# Patient Record
Sex: MALE | Race: WHITE | Employment: UNEMPLOYED | URBAN - METROPOLITAN AREA
[De-identification: names, ages, dates, MRNs, and addresses within clinical notes are randomized per-mention and may not be internally consistent; named-entity substitution may affect disease eponyms.]

---

## 2021-09-03 ENCOUNTER — OFFICE VISIT (OUTPATIENT)
Dept: SURGERY | Age: 41
End: 2021-09-03
Payer: COMMERCIAL

## 2021-09-03 ENCOUNTER — TELEPHONE (OUTPATIENT)
Dept: SURGERY | Age: 41
End: 2021-09-03

## 2021-09-03 VITALS
HEIGHT: 66 IN | HEART RATE: 105 BPM | DIASTOLIC BLOOD PRESSURE: 81 MMHG | OXYGEN SATURATION: 97 % | RESPIRATION RATE: 16 BRPM | SYSTOLIC BLOOD PRESSURE: 128 MMHG | BODY MASS INDEX: 23.46 KG/M2 | WEIGHT: 146 LBS

## 2021-09-03 DIAGNOSIS — K64.2 GRADE III HEMORRHOIDS: Primary | ICD-10-CM

## 2021-09-03 PROCEDURE — G8420 CALC BMI NORM PARAMETERS: HCPCS | Performed by: SURGERY

## 2021-09-03 PROCEDURE — G8427 DOCREV CUR MEDS BY ELIG CLIN: HCPCS | Performed by: SURGERY

## 2021-09-03 PROCEDURE — 4004F PT TOBACCO SCREEN RCVD TLK: CPT | Performed by: SURGERY

## 2021-09-03 PROCEDURE — 99203 OFFICE O/P NEW LOW 30 MIN: CPT | Performed by: SURGERY

## 2021-09-03 RX ORDER — VENLAFAXINE HYDROCHLORIDE 150 MG/1
150 CAPSULE, EXTENDED RELEASE ORAL DAILY
COMMUNITY

## 2021-09-03 RX ORDER — QUETIAPINE FUMARATE 25 MG/1
1 TABLET, FILM COATED ORAL DAILY
COMMUNITY
Start: 2021-07-27

## 2021-09-03 RX ORDER — CLONAZEPAM 0.5 MG/1
1 TABLET, ORALLY DISINTEGRATING ORAL
Status: ON HOLD | COMMUNITY
Start: 2021-08-10 | End: 2021-09-10 | Stop reason: HOSPADM

## 2021-09-03 NOTE — PROGRESS NOTES
PRE-OP INSTRUCTIONS FOR THE SURGICAL PATIENT YOU ARE UNABLE TO MAKE CONTACT FOR AN INTERVIEW:    All patients having surgery or anesthesia are required to be Covid tested OR to have been vaccinated at least 14 days prior to your procedure. It is very important to return our call to [phone] and notify the staff of your last vaccination date otherwise you will be required to complete Covid PCR test within the 5-6 days prior to surgery & quarantine. The results will need to be faxed to PreAdmission Testing at [phone]. 1. Follow all instructions provided to you from your surgeons office, including your ARRIVAL TIME. 2. Enter the MAIN entrance located on [employer] and report to the desk. 3. Bring your insurance & photo ID with you. You may also be asked to pay a co-pay, as you may want to bring a check or credit card with you. 4. Leave all other valuables at home. 5. Arrange for someone to drive you home and be with you for the first 24 hours after discharge. 6. Bring your medication list with you day of surgery with doses and frequency listed (including over the counter medications)  7. You must contact your surgeon for Instructions regarding:              - ALL medication instructions, especially if taking blood thinners, aspirin, or diabetic medication.         -Bariatric patients call surgeon if on diabetic medications as some need to be stopped 1 week preop  - IF  there is a change in your physical condition such as a cold, fever, rash, cuts, sores or any other infection, especially near your surgical site. 8. A Pre-op History and Physical for surgery MUST be completed by your Physician or an Urgent Care within 30 days of your procedure date. Please bring a copy with you on the day of your procedure and along with any other testing performed. 9. DO NOT EAT ANYTHING eight hours prior to arrival for surgery.   May have up to 8 ounces of water 4 hours prior to arrival for surgery. NOTE: ALL Gastric, Bariatric and Bowel surgery patients MUST follow their surgeon's instructions. 10. No gum, candy, mints, or ice chips day of procedure. 11. Please refrain from drinking alcohol the day before or day of your procedure. 12. Please do not smoke the day of your procedure. 13. Dress in loose, comfortable clothing appropriate for redressing after your procedure. Do not wear jewelry (including body piercings), make-up, fingernail polish, lotion, powders or metal hairclips. 15. Contacts will need to be removed prior to surgery. You may want to bring your eye glasses to wear immediately before and after surgery. 14. Dentures will need to be removed before your procedure. 13. Bring cases for your glasses, contacts, dentures, or hearing aids to protect them while you are in surgery. 16. If you use a CPAP, please bring it with you on the day of your procedure. 17. Do not shave or wax for 72 hours prior to procedure near your operative site  18. FOR WOMAN OF CHILDBEARING AGE ONLY- please make sure we can collect a urine sample on arrival.     If you have further questions, you may contact your surgeon's office or us at [phone]     Left instructions on patient's voicemail.     [address], RN.9/3/2021 .4:06 PM

## 2021-09-03 NOTE — LETTER
P - [facility] [address] [place] [address] [phone]  f [phone]    Alisha Mariee MD                        SURGERY ORDER   -- Time of order -- 9/3/21    11:58 AM    Facility:   [facility]  # _________________                               Scheduled by: ____________    Surgery Date & Time:                    Pt arrival:                                                                                       Patient Name:  Claude Woody     :  1980 PCP:  Madeline Neri MD       Home Ph:    [phone] (home)                                                     PROCEDURE:  Excisional hemorrhoidectomy     DIAGNOSIS:      ICD-10-CM    1. Grade III hemorrhoids  K64.2        Anesthesia: General  Time Needed:  1.5 hours   Pt Position:  prone/moira-knife         Outpatient _x__  Admit ___                Pre-Op to be done by: na___  Cardiac Clearance Done by: __na_  Labs Needed: CBC _x_ CMP __x_     Patient to meet with Anesthesiology prior to surgery ___no__     Medications to be stopped 5 days before surgery: _none__  Additional / Special Orders:     Exparel in room  No antibiotics indicated  Handheld Ligasure in room                                                                                                                 Alisha Mariee MD

## 2021-09-03 NOTE — TELEPHONE ENCOUNTER
Patient has been scheduled for:    Procedure: Hemorrhoidectomy  Date: 9/10/21  Time: 10:30AM  Arrival: 8:30AM  Hospital: [facility]: Test on 9/4/21  ASA?: Ibuprofen 7 days   Prep? NPO    Pre-op? n/a    Post-op Appt? EAST 9/24/21 at 10:30AM    Patient advised they will need a . Orders faxed to surgery scheduling. Instructions have been emailed to:  [email or web address]

## 2021-09-03 NOTE — PROGRESS NOTES
Subjective:     Patient is a 37 y.o. man with hemorrhoids    PCP: Dr. Jeff Almeida MD. Results of consultation will be shared via electronic medical record    HPI: Mr. Cecilia Saucedo reports 9 months of worsening pain and bleeding with hemorrhoids. He has to manually reduce them after BM. He reports he is regular and only rarely constipated. He smokes. Denies FH of colon problems. He denies taking any blood thinners. His PMH is relevant for a Schwannoma     Patient Active Problem List    Diagnosis Date Noted    Tinea 10/25/2013    Tobacco abuse     Intradural tumor 06/15/2012    Sciatica 06/15/2012     Past Medical History:   Diagnosis Date    Chronic back pain     Right leg numbness 2011    Tobacco abuse       Past Surgical History:   Procedure Laterality Date    LAMINECTOMY  6/14/2012    s1-s2 with excision of schwamanoma. Dr. Meryle Budd        Not in a hospital admission. No Known Allergies   Social History     Tobacco Use    Smoking status: Current Some Day Smoker     Packs/day: 0.25    Smokeless tobacco: Never Used   Substance Use Topics    Alcohol use: Yes     Alcohol/week: 1.0 standard drinks     Types: 1 Cans of beer per week     Comment: daily      Family History   Problem Relation Age of Onset    Arthritis Mother     ADHD Father       Review of Systems    GEN: reviewed and negative except as noted in HPI. GI: reviewed and negative except as noted in HPI. Objective:     GEN: appears well, no distress, appears stated age  PSYCH: normal mood, normal affect  NECK: no neck masses, trachea midline  ENT: moist oral mucosa; anicteric  SKIN: no rash or jaundice  CV: regular heart rate and rhythm  PULM: normal respiratory effort, no wheezing  GI: soft non tender abdomen. Normal bowel sounds  RECTAL: Posterior anus there is a large mixed hemorrhoid slightly more on right side. Second right anterior lesion hanging out.  The posterior lesion has thickening and whitening of the mucosa consistent with chronic prolapse and two small pinpoint erosions. Examined with Elta Sandhoff RN present   EXT/NEURO: normal gait, strength/sensation grossly intact in all extremities      Assessment:     Hemorrhoids grade 3-4     Plan:     Discussed RBA of various treatment options. Discussed the non operative option is to supplement the diet with some fiber and avoid straining. Discussed toilet habits and recommended to not spend long time sitting on commode. Discussed given the degree of his hemorrhoids this may cause improvement but not resolve them completely. Discussed the surgical option based on his exam is excisional hemorrhoidectomy    The RBA of excisional hemorrhoidectomy were reviewed. Discussed risk of bleeding given hemorrhoids are large blood vessels and that a mild amount of bleeding postoperatively is expected but that significant bleeding can occur and warrant a return trip to the operating room. Discussed small risk of infection. Discussed risk of stenosis if too much hemorrhoid tissue is removed. Discussed because of this small hemorrhoids may be left behind and may need to be removed in staged fashion or left alone if symptoms are better. Discussed because of this goal of hemorrhoid operation is improvement in symptoms. Finally we reviewed pain expectations. Discussed hemorrhoidectomy remains a painful operation and often requires 2-3 weeks of down time and or missed work. Discussed urinary retention can also happen. Discussed in between these two options would be hemorrhoid banding but given his exam and his hemorrhoids below the dentate line I don't expect this would be helpful.      Will schedule     Javier Yung M.D.  9/3/21   11:54 AM

## 2021-09-09 ENCOUNTER — TELEPHONE (OUTPATIENT)
Dept: SURGERY | Age: 41
End: 2021-09-09

## 2021-09-09 ENCOUNTER — ANESTHESIA EVENT (OUTPATIENT)
Dept: OPERATING ROOM | Age: 41
End: 2021-09-09
Payer: COMMERCIAL

## 2021-09-09 NOTE — TELEPHONE ENCOUNTER
I have placed a reminder call to patient for upcoming procedure. Did you speak directly to patient or leave a voicemail?  Left a voicemail    NPO    Arrive at the main entrance of [facility] at 8:30AM

## 2021-09-10 ENCOUNTER — ANESTHESIA (OUTPATIENT)
Dept: OPERATING ROOM | Age: 41
End: 2021-09-10
Payer: COMMERCIAL

## 2021-09-10 ENCOUNTER — HOSPITAL ENCOUNTER (OUTPATIENT)
Age: 41
Setting detail: OUTPATIENT SURGERY
Discharge: HOME | End: 2021-09-10
Attending: SURGERY | Admitting: SURGERY
Payer: COMMERCIAL

## 2021-09-10 VITALS — DIASTOLIC BLOOD PRESSURE: 66 MMHG | SYSTOLIC BLOOD PRESSURE: 106 MMHG | TEMPERATURE: 96.8 F | OXYGEN SATURATION: 99 %

## 2021-09-10 VITALS
DIASTOLIC BLOOD PRESSURE: 82 MMHG | HEIGHT: 66 IN | SYSTOLIC BLOOD PRESSURE: 128 MMHG | BODY MASS INDEX: 23.3 KG/M2 | RESPIRATION RATE: 14 BRPM | WEIGHT: 145 LBS | HEART RATE: 95 BPM | TEMPERATURE: 97.7 F | OXYGEN SATURATION: 95 %

## 2021-09-10 DIAGNOSIS — K64.2 GRADE III INTERNAL HEMORRHOIDS: ICD-10-CM

## 2021-09-10 LAB
A/G RATIO: 1.3 (ref 1.1–2.2)
ALBUMIN SERPL-MCNC: 4.4 G/DL (ref 3.4–5)
ALP BLD-CCNC: 95 U/L (ref 40–129)
ALT SERPL-CCNC: 16 U/L (ref 10–40)
ANION GAP SERPL CALCULATED.3IONS-SCNC: 12 MMOL/L (ref 3–16)
AST SERPL-CCNC: 20 U/L (ref 15–37)
BILIRUB SERPL-MCNC: 0.4 MG/DL (ref 0–1)
BUN BLDV-MCNC: 11 MG/DL (ref 7–20)
CALCIUM SERPL-MCNC: 9.4 MG/DL (ref 8.3–10.6)
CHLORIDE BLD-SCNC: 101 MMOL/L (ref 99–110)
CO2: 25 MMOL/L (ref 21–32)
CREAT SERPL-MCNC: 0.8 MG/DL (ref 0.9–1.3)
GFR AFRICAN AMERICAN: >60
GFR NON-AFRICAN AMERICAN: >60
GLOBULIN: 3.5 G/DL
GLUCOSE BLD-MCNC: 96 MG/DL (ref 70–99)
HCT VFR BLD CALC: 42.9 % (ref 40.5–52.5)
HEMOGLOBIN: 14.8 G/DL (ref 13.5–17.5)
MCH RBC QN AUTO: 31.4 PG (ref 26–34)
MCHC RBC AUTO-ENTMCNC: 34.4 G/DL (ref 31–36)
MCV RBC AUTO: 91.1 FL (ref 80–100)
PDW BLD-RTO: 13.6 % (ref 12.4–15.4)
PLATELET # BLD: 186 K/UL (ref 135–450)
PMV BLD AUTO: 8.9 FL (ref 5–10.5)
POTASSIUM SERPL-SCNC: 4.1 MMOL/L (ref 3.5–5.1)
RBC # BLD: 4.71 M/UL (ref 4.2–5.9)
SODIUM BLD-SCNC: 138 MMOL/L (ref 136–145)
TOTAL PROTEIN: 7.9 G/DL (ref 6.4–8.2)
WBC # BLD: 9 K/UL (ref 4–11)

## 2021-09-10 PROCEDURE — 46260 REMOVE IN/EX HEM GROUPS 2+: CPT | Performed by: SURGERY

## 2021-09-10 PROCEDURE — 7100000001 HC PACU RECOVERY - ADDTL 15 MIN: Performed by: SURGERY

## 2021-09-10 PROCEDURE — 6360000002 HC RX W HCPCS: Performed by: ANESTHESIOLOGY

## 2021-09-10 PROCEDURE — 2580000003 HC RX 258: Performed by: ANESTHESIOLOGY

## 2021-09-10 PROCEDURE — 7100000000 HC PACU RECOVERY - FIRST 15 MIN: Performed by: SURGERY

## 2021-09-10 PROCEDURE — 6360000002 HC RX W HCPCS: Performed by: NURSE ANESTHETIST, CERTIFIED REGISTERED

## 2021-09-10 PROCEDURE — C9290 INJ, BUPIVACAINE LIPOSOME: HCPCS | Performed by: SURGERY

## 2021-09-10 PROCEDURE — 80053 COMPREHEN METABOLIC PANEL: CPT

## 2021-09-10 PROCEDURE — 85027 COMPLETE CBC AUTOMATED: CPT

## 2021-09-10 PROCEDURE — 6370000000 HC RX 637 (ALT 250 FOR IP): Performed by: SURGERY

## 2021-09-10 PROCEDURE — 3700000000 HC ANESTHESIA ATTENDED CARE: Performed by: SURGERY

## 2021-09-10 PROCEDURE — 3700000001 HC ADD 15 MINUTES (ANESTHESIA): Performed by: SURGERY

## 2021-09-10 PROCEDURE — 7100000010 HC PHASE II RECOVERY - FIRST 15 MIN: Performed by: SURGERY

## 2021-09-10 PROCEDURE — 6360000002 HC RX W HCPCS: Performed by: SURGERY

## 2021-09-10 PROCEDURE — 2709999900 HC NON-CHARGEABLE SUPPLY: Performed by: SURGERY

## 2021-09-10 PROCEDURE — 88304 TISSUE EXAM BY PATHOLOGIST: CPT

## 2021-09-10 PROCEDURE — 3600000014 HC SURGERY LEVEL 4 ADDTL 15MIN: Performed by: SURGERY

## 2021-09-10 PROCEDURE — 2580000003 HC RX 258: Performed by: SURGERY

## 2021-09-10 PROCEDURE — 7100000011 HC PHASE II RECOVERY - ADDTL 15 MIN: Performed by: SURGERY

## 2021-09-10 PROCEDURE — 3600000004 HC SURGERY LEVEL 4 BASE: Performed by: SURGERY

## 2021-09-10 PROCEDURE — 2500000003 HC RX 250 WO HCPCS: Performed by: NURSE ANESTHETIST, CERTIFIED REGISTERED

## 2021-09-10 RX ORDER — MIDAZOLAM HYDROCHLORIDE 1 MG/ML
INJECTION INTRAMUSCULAR; INTRAVENOUS PRN
Status: DISCONTINUED | OUTPATIENT
Start: 2021-09-10 | End: 2021-09-10 | Stop reason: SDUPTHER

## 2021-09-10 RX ORDER — DEXAMETHASONE SODIUM PHOSPHATE 4 MG/ML
INJECTION, SOLUTION INTRA-ARTICULAR; INTRALESIONAL; INTRAMUSCULAR; INTRAVENOUS; SOFT TISSUE PRN
Status: DISCONTINUED | OUTPATIENT
Start: 2021-09-10 | End: 2021-09-10 | Stop reason: SDUPTHER

## 2021-09-10 RX ORDER — ONDANSETRON 2 MG/ML
INJECTION INTRAMUSCULAR; INTRAVENOUS PRN
Status: DISCONTINUED | OUTPATIENT
Start: 2021-09-10 | End: 2021-09-10 | Stop reason: SDUPTHER

## 2021-09-10 RX ORDER — LIDOCAINE HYDROCHLORIDE 10 MG/ML
1 INJECTION, SOLUTION EPIDURAL; INFILTRATION; INTRACAUDAL; PERINEURAL
Status: DISCONTINUED | OUTPATIENT
Start: 2021-09-10 | End: 2021-09-10 | Stop reason: HOSPADM

## 2021-09-10 RX ORDER — FENTANYL CITRATE 50 UG/ML
25 INJECTION, SOLUTION INTRAMUSCULAR; INTRAVENOUS EVERY 5 MIN PRN
Status: DISCONTINUED | OUTPATIENT
Start: 2021-09-10 | End: 2021-09-10 | Stop reason: HOSPADM

## 2021-09-10 RX ORDER — ONDANSETRON 2 MG/ML
4 INJECTION INTRAMUSCULAR; INTRAVENOUS
Status: DISCONTINUED | OUTPATIENT
Start: 2021-09-10 | End: 2021-09-10 | Stop reason: HOSPADM

## 2021-09-10 RX ORDER — SODIUM CHLORIDE, SODIUM LACTATE, POTASSIUM CHLORIDE, CALCIUM CHLORIDE 600; 310; 30; 20 MG/100ML; MG/100ML; MG/100ML; MG/100ML
INJECTION, SOLUTION INTRAVENOUS CONTINUOUS
Status: DISCONTINUED | OUTPATIENT
Start: 2021-09-10 | End: 2021-09-10 | Stop reason: HOSPADM

## 2021-09-10 RX ORDER — SODIUM CHLORIDE 9 MG/ML
25 INJECTION, SOLUTION INTRAVENOUS PRN
Status: DISCONTINUED | OUTPATIENT
Start: 2021-09-10 | End: 2021-09-10 | Stop reason: HOSPADM

## 2021-09-10 RX ORDER — ENALAPRILAT 2.5 MG/2ML
1.25 INJECTION INTRAVENOUS
Status: DISCONTINUED | OUTPATIENT
Start: 2021-09-10 | End: 2021-09-10 | Stop reason: HOSPADM

## 2021-09-10 RX ORDER — SODIUM CHLORIDE 9 MG/ML
INJECTION, SOLUTION INTRAVENOUS CONTINUOUS
Status: DISCONTINUED | OUTPATIENT
Start: 2021-09-10 | End: 2021-09-10 | Stop reason: HOSPADM

## 2021-09-10 RX ORDER — MAGNESIUM HYDROXIDE 1200 MG/15ML
LIQUID ORAL CONTINUOUS PRN
Status: COMPLETED | OUTPATIENT
Start: 2021-09-10 | End: 2021-09-10

## 2021-09-10 RX ORDER — HYDRALAZINE HYDROCHLORIDE 20 MG/ML
5 INJECTION INTRAMUSCULAR; INTRAVENOUS EVERY 5 MIN PRN
Status: DISCONTINUED | OUTPATIENT
Start: 2021-09-10 | End: 2021-09-10 | Stop reason: HOSPADM

## 2021-09-10 RX ORDER — OXYCODONE HYDROCHLORIDE AND ACETAMINOPHEN 5; 325 MG/1; MG/1
1-2 TABLET ORAL EVERY 6 HOURS PRN
Qty: 42 TABLET | Refills: 0 | Status: SHIPPED | OUTPATIENT
Start: 2021-09-10 | End: 2021-09-17

## 2021-09-10 RX ORDER — LIDOCAINE HYDROCHLORIDE 20 MG/ML
INJECTION, SOLUTION INFILTRATION; PERINEURAL PRN
Status: DISCONTINUED | OUTPATIENT
Start: 2021-09-10 | End: 2021-09-10 | Stop reason: SDUPTHER

## 2021-09-10 RX ORDER — LABETALOL HYDROCHLORIDE 5 MG/ML
5 INJECTION, SOLUTION INTRAVENOUS EVERY 10 MIN PRN
Status: DISCONTINUED | OUTPATIENT
Start: 2021-09-10 | End: 2021-09-10 | Stop reason: HOSPADM

## 2021-09-10 RX ORDER — PROPOFOL 10 MG/ML
INJECTION, EMULSION INTRAVENOUS PRN
Status: DISCONTINUED | OUTPATIENT
Start: 2021-09-10 | End: 2021-09-10 | Stop reason: SDUPTHER

## 2021-09-10 RX ORDER — SODIUM CHLORIDE 0.9 % (FLUSH) 0.9 %
10 SYRINGE (ML) INJECTION EVERY 12 HOURS SCHEDULED
Status: DISCONTINUED | OUTPATIENT
Start: 2021-09-10 | End: 2021-09-10 | Stop reason: HOSPADM

## 2021-09-10 RX ORDER — SODIUM CHLORIDE 0.9 % (FLUSH) 0.9 %
10 SYRINGE (ML) INJECTION PRN
Status: DISCONTINUED | OUTPATIENT
Start: 2021-09-10 | End: 2021-09-10 | Stop reason: HOSPADM

## 2021-09-10 RX ORDER — FENTANYL CITRATE 50 UG/ML
50 INJECTION, SOLUTION INTRAMUSCULAR; INTRAVENOUS EVERY 5 MIN PRN
Status: DISCONTINUED | OUTPATIENT
Start: 2021-09-10 | End: 2021-09-10 | Stop reason: HOSPADM

## 2021-09-10 RX ORDER — DOCUSATE SODIUM 100 MG/1
100 CAPSULE, LIQUID FILLED ORAL 2 TIMES DAILY
Qty: 60 CAPSULE | Refills: 0 | Status: SHIPPED | OUTPATIENT
Start: 2021-09-10 | End: 2021-10-10

## 2021-09-10 RX ORDER — OXYCODONE HYDROCHLORIDE AND ACETAMINOPHEN 5; 325 MG/1; MG/1
1 TABLET ORAL
Status: COMPLETED | OUTPATIENT
Start: 2021-09-10 | End: 2021-09-10

## 2021-09-10 RX ORDER — FENTANYL CITRATE 50 UG/ML
INJECTION, SOLUTION INTRAMUSCULAR; INTRAVENOUS PRN
Status: DISCONTINUED | OUTPATIENT
Start: 2021-09-10 | End: 2021-09-10 | Stop reason: SDUPTHER

## 2021-09-10 RX ORDER — DIAZEPAM 5 MG/1
5 TABLET ORAL EVERY 8 HOURS PRN
Qty: 21 TABLET | Refills: 0 | Status: SHIPPED | OUTPATIENT
Start: 2021-09-10 | End: 2021-09-17

## 2021-09-10 RX ORDER — ROCURONIUM BROMIDE 10 MG/ML
INJECTION, SOLUTION INTRAVENOUS PRN
Status: DISCONTINUED | OUTPATIENT
Start: 2021-09-10 | End: 2021-09-10 | Stop reason: SDUPTHER

## 2021-09-10 RX ADMIN — SODIUM CHLORIDE, POTASSIUM CHLORIDE, SODIUM LACTATE AND CALCIUM CHLORIDE: 600; 310; 30; 20 INJECTION, SOLUTION INTRAVENOUS at 09:37

## 2021-09-10 RX ADMIN — HYDROMORPHONE HYDROCHLORIDE 0.25 MG: 1 INJECTION, SOLUTION INTRAMUSCULAR; INTRAVENOUS; SUBCUTANEOUS at 12:46

## 2021-09-10 RX ADMIN — DEXAMETHASONE SODIUM PHOSPHATE 4 MG: 4 INJECTION, SOLUTION INTRAMUSCULAR; INTRAVENOUS at 10:58

## 2021-09-10 RX ADMIN — LIDOCAINE HYDROCHLORIDE 100 MG: 20 INJECTION, SOLUTION INFILTRATION; PERINEURAL at 10:53

## 2021-09-10 RX ADMIN — FENTANYL CITRATE 100 MCG: 50 INJECTION, SOLUTION INTRAMUSCULAR; INTRAVENOUS at 10:53

## 2021-09-10 RX ADMIN — ONDANSETRON 4 MG: 2 INJECTION INTRAMUSCULAR; INTRAVENOUS at 10:58

## 2021-09-10 RX ADMIN — OXYCODONE HYDROCHLORIDE AND ACETAMINOPHEN 1 TABLET: 5; 325 TABLET ORAL at 12:28

## 2021-09-10 RX ADMIN — SUGAMMADEX 200 MG: 100 INJECTION, SOLUTION INTRAVENOUS at 11:45

## 2021-09-10 RX ADMIN — PROPOFOL 240 MG: 10 INJECTION, EMULSION INTRAVENOUS at 10:53

## 2021-09-10 RX ADMIN — MIDAZOLAM HYDROCHLORIDE 2 MG: 2 INJECTION, SOLUTION INTRAMUSCULAR; INTRAVENOUS at 10:53

## 2021-09-10 RX ADMIN — ROCURONIUM BROMIDE 50 MG: 10 INJECTION INTRAVENOUS at 10:53

## 2021-09-10 ASSESSMENT — PAIN SCALES - GENERAL
PAINLEVEL_OUTOF10: 4
PAINLEVEL_OUTOF10: 4
PAINLEVEL_OUTOF10: 2
PAINLEVEL_OUTOF10: 0

## 2021-09-10 ASSESSMENT — PULMONARY FUNCTION TESTS
PIF_VALUE: 23
PIF_VALUE: 23
PIF_VALUE: 16
PIF_VALUE: 23
PIF_VALUE: 10
PIF_VALUE: 24
PIF_VALUE: 23
PIF_VALUE: 23
PIF_VALUE: 24
PIF_VALUE: 25
PIF_VALUE: 17
PIF_VALUE: 4
PIF_VALUE: 23
PIF_VALUE: 26
PIF_VALUE: 15
PIF_VALUE: 14
PIF_VALUE: 22
PIF_VALUE: 23
PIF_VALUE: 24
PIF_VALUE: 24
PIF_VALUE: 23
PIF_VALUE: 19
PIF_VALUE: 24
PIF_VALUE: 25
PIF_VALUE: 22
PIF_VALUE: 24
PIF_VALUE: 24
PIF_VALUE: 23
PIF_VALUE: 24
PIF_VALUE: 23
PIF_VALUE: 23
PIF_VALUE: 19
PIF_VALUE: 27
PIF_VALUE: 23
PIF_VALUE: 25
PIF_VALUE: 24
PIF_VALUE: 23
PIF_VALUE: 5
PIF_VALUE: 26
PIF_VALUE: 23
PIF_VALUE: 24
PIF_VALUE: 26
PIF_VALUE: 23
PIF_VALUE: 25
PIF_VALUE: 19
PIF_VALUE: 1
PIF_VALUE: 23
PIF_VALUE: 23
PIF_VALUE: 24
PIF_VALUE: 23
PIF_VALUE: 24
PIF_VALUE: 24
PIF_VALUE: 23
PIF_VALUE: 24
PIF_VALUE: 14
PIF_VALUE: 24
PIF_VALUE: 25
PIF_VALUE: 23
PIF_VALUE: 24
PIF_VALUE: 1
PIF_VALUE: 23

## 2021-09-10 ASSESSMENT — PAIN DESCRIPTION - FREQUENCY
FREQUENCY: CONTINUOUS

## 2021-09-10 ASSESSMENT — PAIN DESCRIPTION - PAIN TYPE
TYPE: ACUTE PAIN

## 2021-09-10 ASSESSMENT — PAIN DESCRIPTION - PROGRESSION
CLINICAL_PROGRESSION: GRADUALLY IMPROVING
CLINICAL_PROGRESSION: GRADUALLY WORSENING

## 2021-09-10 ASSESSMENT — PAIN DESCRIPTION - ONSET
ONSET: AWAKENED FROM SLEEP
ONSET: ON-GOING
ONSET: ON-GOING

## 2021-09-10 ASSESSMENT — PAIN DESCRIPTION - LOCATION
LOCATION: RECTUM

## 2021-09-10 NOTE — PROGRESS NOTES
PACU Transfer to [facility]  Pt's Current Allergies: Patient has no known allergies. Pt meets criteria to transfer to next phase of care per Dearl Tonying and KELSEY standards    No results for input(s): POCGLU in the last 72 hours. Vitals:    09/10/21 1245   BP: 134/78   Pulse: 90   Resp: 19   Temp: 98.2 °F (36.8 °C)   SpO2: 95%       Intake/Output Summary (Last 24 hours) at 9/10/2021 1256  Last data filed at 9/10/2021 1245  Gross per 24 hour   Intake 1090 ml   Output --   Net 1090 ml     Fluffs, ABD & mesh underwear in place with medium amount of bloody drainage. Pain assessment:  present - adequately treated  Pain Level: 4    Pt tolerating po fluids and crackers. Patient was assessed for unknown alterations to skin integrity. There were not unknown alterations observed. Patient transferred to care of Arsh Guevara RN.   Family (mother Alistair Mendez) updated and directed to Arsh Guevara    9/10/2021 12:56 PM

## 2021-09-10 NOTE — PROGRESS NOTES
Pt to [facility] for excisional hemorrhoidectomy. Pt is Covid 'negative,' copy of same in chart. Pt is alert; oriented X 4; speech clear; breathing easily on RA; denies any pain; walks with steady gait without assist.  Pt reports having BM today prior to surgery. After warming pt with blankets, #18 IV placed in right forearm and CBC and CMP drawn and sent to lab. Pt has call light within reach. No abx ordered. Awaiting arrival of surgeon.

## 2021-09-10 NOTE — OP NOTE
Operative Note        Patient: Darby Fuentes  YOB: 1980  MRN: [id number]    Date of Procedure: 9/10/2021    Pre-Op Diagnosis: Grade III internal hemorrhoids [K64.2]    Post-Op Diagnosis: SAME       Procedure(s):  EXCISIONAL HEMORRHOIDECTOMY x 3    Surgeon(s):  Rebecca Beck MD    Assistant:  Resident: Samuel Marcus DO    Anesthesia: General    Estimated Blood Loss (mL): 10 cc    Complications: None    Specimens:   ID Type Source Tests Collected by Time Destination   A : HEMORRHOIDS  Tissue Tissue SURGICAL PATHOLOGY Rebecca Beck MD 9/10/2021 1117          Findings: see full note: 3 column hemorrhoidectomy     After informed consent was obtained the patient was taken to the operating room. General anesthesia was given. Time out was called to confirm key components. The patient was placed in the lithotomy position with appropriate padding. The patient was then prepped and draped in the usual sterile fashion. We saw three large hemorrhoid columns in the classical right anterior, right posterior, and left lateral position. The right posterior hemorrhoid began to bleed on anoscopy. These had thickening and whitening of the mucosa consistent with chronic prolapse. We placed a hemorrhoid clamp on the right posterior lesion and elevated it away from the underlying structures. We then placed a 3-0 chromic suture at the internal apex of the hemorrhoid. We then divided the mucosa using a 15 scalpel blade. We divided the rest of the hemorrhoid with Ligasure. The 3-0 chromic was then used in a running locking fashion to close the defect with care taken to approximate mucosa to mucosa and skin to skin. There was slight oozing from the proximal aspect of the wound that was controlled with a second chromic stitch. The left lateral and right anterior columns were removed in similar fashion.  Next to the right anterior column was a small fissure with exposed sphincter muscle and we took some redundant mucosa from the area of the hemorrhoid and performed a flap closure over this area with chromic. The patient did not have a frankly hypertonic sphincter. Hemostasis was seen at the conclusion of the case. We injected Exparel for post operative pain control. A rolled gelfoam was placed in the anus to promote hemostasis. No other anorectal pathology seen.       Dr. Alice Montoya was present and scrubbed for all aspects of the operation      Electronically signed by Giovanna Mejia MD on 9/10/2021 at 12:52 PM

## 2021-09-10 NOTE — PROGRESS NOTES
Ambulatory Surgery/Procedure Discharge Note    Vitals:    09/10/21 1334   BP: 128/82   Pulse: 95   Resp: 14   Temp: 97.7 °F (36.5 °C)   SpO2: 95%       In: 1090 [P.O.:240; I.V.:850]  Out: - 100    Restroom use offered before discharge. yes  Pain assessment: Pain Level: 4        Patient discharged to home/self care.  Patient discharged via wheel chair by transporter to waiting family/S.O.       9/10/2021 2:24 PM

## 2021-09-10 NOTE — PROGRESS NOTES
Patient admitted to PACU # 15 from OR at 1205 post EXCISIONAL HEMORRHOIDECTOMY Yessi Woo MD .  Attached to PACU monitoring system and report received from anesthesia provider. Patient was reported to be hemodynamically stable during procedure. Patient drowsy on admission and states he has 2/10 pain to rectal surgery site. Fluffs, ABD and mesh underwear in place with small amount of bloody drainage.

## 2021-09-10 NOTE — H&P
of Exercise per Week:     Minutes of Exercise per Session:    Stress:     Feeling of Stress :    Social Connections:     Frequency of Communication with Friends and Family:     Frequency of Social Gatherings with Friends and Family:     Attends Shinto Services: Active Member of Clubs or Organizations:     Attends Club or Organization Meetings:     Marital Status:    Intimate Partner Violence:     Fear of Current or Ex-Partner:     Emotionally Abused:     Physically Abused:     Sexually Abused:          Medications Prior to Admission:      Prior to Admission medications    Medication Sig Start Date End Date Taking? Authorizing Provider   venlafaxine (EFFEXOR XR) 150 MG extended release capsule Take 150 mg by mouth daily   Yes Historical Provider, MD   clonazePAM (KLONOPIN) 0.5 MG disintegrating tablet Take 1 tablet by mouth every 6-8 hours as needed. 8/10/21  Yes Historical Provider, MD   QUEtiapine (SEROQUEL) 25 MG tablet Take 1 tablet by mouth daily 7/27/21  Yes Historical Provider, MD         Allergies:  Patient has no known allergies. PHYSICAL EXAM:      /75   Pulse 95   Temp 98.8 °F (37.1 °C) (Oral)   Resp 18   Ht 5' 6\" (1.676 m)   Wt 145 lb (65.8 kg)   SpO2 97%   BMI 23.40 kg/m²      Airway:  Airway patent with no audible stridor    Heart:  Regular rate and rhythm, No murmur noted    Lungs:  No increased work of breathing, good air exchange, clear to auscultation bilaterally, no crackles or wheezing    Abdomen:  Soft, non-distended, non-tender, no rebound tenderness or guarding, and no masses palpated    ASSESSMENT AND PLAN     Patient is a 37 y.o. male with above specified procedure planned. 1.  The patients history and physical was obtained and signed off by the pre-admission testing department. Patient seen and focused exam done today- no new changes since last physical exam on 9/3/21    2. Access to ancillary services are available per request of the provider.     Citlaly Fagan Leodan Irving, APRN - CNP     9/10/2021    I performed a history and physical examination of the patient and discussed management with the resident. I reviewed the resident's note and agree with the documented findings and plan of care.  WQAAS reviewed preoperatively    Lisha Kc M.D.  9/10/21   12:40 PM

## 2021-09-10 NOTE — ANESTHESIA PRE PROCEDURE
Department of Anesthesiology  Preprocedure Note       Name:  Latoya Gordon   Age:  37 y.o.  :  1980                                          MRN:  [id number]         Date:  9/10/2021      Surgeon: Silvia Abreu):  Giovanna Mejia MD    Procedure: Procedure(s):  EXCISIONAL HEMORRHOIDECTOMY    Medications prior to admission:   Prior to Admission medications    Medication Sig Start Date End Date Taking? Authorizing Provider   venlafaxine (EFFEXOR XR) 150 MG extended release capsule Take 150 mg by mouth daily    Historical Provider, MD   clonazePAM (KLONOPIN) 0.5 MG disintegrating tablet Take 1 tablet by mouth every 6-8 hours as needed. 8/10/21   Historical Provider, MD   QUEtiapine (SEROQUEL) 25 MG tablet Take 1 tablet by mouth daily 21   Historical Provider, MD   ondansetron (ZOFRAN) 4 MG tablet Take 1 tablet by mouth every 8 hours as needed for Nausea. Patient not taking: Reported on 9/3/2021 3/23/15   Bridgette E Nora Goldberg, PA-C   ibuprofen (ADVIL;MOTRIN) 800 MG tablet Take 1 tablet by mouth 3 times daily (with meals).  12   Dawson Herbert MD       Current medications:    Current Facility-Administered Medications   Medication Dose Route Frequency Provider Last Rate Last Admin    sodium chloride flush 0.9 % injection 10 mL  10 mL IntraVENous 2 times per day Leesa Ring,         sodium chloride flush 0.9 % injection 10 mL  10 mL IntraVENous PRN Leesa Ring, DO        0.9 % sodium chloride infusion  25 mL IntraVENous PRN Leesa Ring, DO        0.9 % sodium chloride infusion   IntraVENous Continuous Noelbern Ring, DO        lactated ringers infusion   IntraVENous Continuous Leesa Ring, DO        lactated ringers infusion   IntraVENous Continuous Daryn Ceballos MD        sodium chloride flush 0.9 % injection 10 mL  10 mL IntraVENous 2 times per day Daryn Ceballos MD        sodium chloride flush 0.9 % injection 10 mL  10 mL IntraVENous PRN Daryn Ceballos MD        0.9 % sodium chloride infusion  25 mL IntraVENous PRN Marisa Mckoy MD        lidocaine PF 1 % injection 1 mL  1 mL IntraDERmal Once PRN Marisa Mckoy MD           Allergies:  No Known Allergies    Problem List:    Patient Active Problem List   Diagnosis Code    Intradural tumor D49.7    Sciatica M54.30    Tinea B35.9    Tobacco abuse Z72.0       Past Medical History:        Diagnosis Date    Chronic back pain     Right leg numbness 2011    Tobacco abuse        Past Surgical History:        Procedure Laterality Date    LAMINECTOMY  6/14/2012    s1-s2 with excision of schwamanoma. Dr. Lao Offer History:    Social History     Tobacco Use    Smoking status: Current Some Day Smoker     Packs/day: 0.25    Smokeless tobacco: Never Used   Substance Use Topics    Alcohol use: Yes     Alcohol/week: 1.0 standard drinks     Types: 1 Cans of beer per week     Comment: daily                                Ready to quit: Not Answered  Counseling given: Not Answered      Vital Signs (Current):   Vitals:    09/03/21 1607   Weight: 146 lb (66.2 kg)   Height: 5' 6\" (1.676 m)                                              BP Readings from Last 3 Encounters:   09/03/21 128/81   01/23/14 110/62   10/25/13 118/68       NPO Status:                                                                                 BMI:   Wt Readings from Last 3 Encounters:   09/03/21 146 lb (66.2 kg)   09/03/21 146 lb (66.2 kg)   01/23/14 132 lb (59.9 kg)     Body mass index is 23.57 kg/m².     CBC:   Lab Results   Component Value Date    WBC 5.4 03/23/2015    RBC 5.24 03/23/2015    HGB 15.9 03/23/2015    HCT 47.5 03/23/2015    MCV 90.7 03/23/2015    RDW 12.6 03/23/2015     03/23/2015       CMP:   Lab Results   Component Value Date     03/23/2015    K 3.7 03/23/2015    CL 98 03/23/2015    CO2 27 03/23/2015    BUN 9 03/23/2015    CREATININE 0.7 03/23/2015    GFRAA >60 03/23/2015    GFRAA >60 06/15/2012    AGRATIO 1.6 03/23/2015    LABGLOM >60 03/23/2015    GLUCOSE 99 03/23/2015    PROT 6.8 03/23/2015    CALCIUM 8.5 03/23/2015    BILITOT 0.8 03/23/2015    ALKPHOS 62 03/23/2015    AST 18 03/23/2015    ALT 15 03/23/2015       POC Tests: No results for input(s): POCGLU, POCNA, POCK, POCCL, POCBUN, POCHEMO, POCHCT in the last 72 hours. Coags:   Lab Results   Component Value Date    PROTIME 10.7 05/24/2012    INR 0.94 05/24/2012    APTT 33.1 05/24/2012       HCG (If Applicable): No results found for: PREGTESTUR, PREGSERUM, HCG, HCGQUANT     ABGs: No results found for: PHART, PO2ART, KPN5ISQ, UHB2CLJ, BEART, L3CWDOYN     Type & Screen (If Applicable):  No results found for: LABABO, LABRH    Drug/Infectious Status (If Applicable):  No results found for: HIV, HEPCAB    COVID-19 Screening (If Applicable): No results found for: COVID19        Anesthesia Evaluation  Patient summary reviewed and Nursing notes reviewed no history of anesthetic complications:   Airway: Mallampati: III  TM distance: >3 FB   Neck ROM: full  Mouth opening: > = 3 FB Dental: normal exam         Pulmonary:                             ROS comment: Smokes less than one pack per day    Cardiovascular:Negative CV ROS                      Neuro/Psych:   Negative Neuro/Psych ROS              GI/Hepatic/Renal: Neg GI/Hepatic/Renal ROS            Endo/Other: Negative Endo/Other ROS                    Abdominal:             Vascular: negative vascular ROS. Other Findings:             Anesthesia Plan      general     ASA 2       Induction: intravenous. Anesthetic plan and risks discussed with patient.                       Mata Castro MD   9/10/2021

## 2021-09-15 ENCOUNTER — TELEPHONE (OUTPATIENT)
Dept: SURGERY | Age: 41
End: 2021-09-15

## 2021-09-15 NOTE — TELEPHONE ENCOUNTER
Called and spoke with patient. Had a BM today and passed some blood. It dripped then finally stopped.      Discussed this is normal and expected to have some bleeding post op    Can see in office if he is concerned or check CBC    He will call office if wants to be seen    Sidney Masters M.D.  9/15/21   2:17 PM

## 2021-09-15 NOTE — TELEPHONE ENCOUNTER
Pt called in with concerns about the amount of post op bleeding he is having. He would like to speak to Dr. Mikael Cortez. Please call him to discuss.

## 2021-09-17 ENCOUNTER — TELEPHONE (OUTPATIENT)
Dept: SURGERY | Age: 41
End: 2021-09-17

## 2021-09-17 DIAGNOSIS — K64.9 HEMORRHOIDS, UNSPECIFIED HEMORRHOID TYPE: Primary | ICD-10-CM

## 2021-09-17 RX ORDER — DIAZEPAM 5 MG/1
5 TABLET ORAL EVERY 8 HOURS PRN
Qty: 21 TABLET | Refills: 0 | Status: SHIPPED | OUTPATIENT
Start: 2021-09-17 | End: 2021-09-27

## 2021-09-17 RX ORDER — HYDROCODONE BITARTRATE AND ACETAMINOPHEN 5; 325 MG/1; MG/1
1 TABLET ORAL EVERY 4 HOURS PRN
Qty: 42 TABLET | Refills: 0 | Status: SHIPPED | OUTPATIENT
Start: 2021-09-17 | End: 2021-09-24

## 2021-09-17 NOTE — TELEPHONE ENCOUNTER
Patient would like to have refill of diazapam and norco. States he was constipated and finally had bowel movement and it was excruciating.

## 2021-09-24 ENCOUNTER — OFFICE VISIT (OUTPATIENT)
Dept: SURGERY | Age: 41
End: 2021-09-24

## 2021-09-24 VITALS
SYSTOLIC BLOOD PRESSURE: 134 MMHG | BODY MASS INDEX: 23.01 KG/M2 | DIASTOLIC BLOOD PRESSURE: 72 MMHG | WEIGHT: 143.2 LBS | OXYGEN SATURATION: 99 % | TEMPERATURE: 96.9 F | HEART RATE: 92 BPM | HEIGHT: 66 IN

## 2021-09-24 DIAGNOSIS — Z09 POSTOP CHECK: Primary | ICD-10-CM

## 2021-09-24 PROCEDURE — 99024 POSTOP FOLLOW-UP VISIT: CPT | Performed by: SURGERY

## 2021-09-24 NOTE — PROGRESS NOTES
Subjective:       Jc Gonzalez presents to the clinic 2 weeks after hemorrhoidectomy    HPI: Doing well since last seen. Had some pain and bleeding post op now doing better. He found taking Miralax helped. Objective:      /72 (Site: Left Upper Arm, Position: Sitting, Cuff Size: Medium Adult)   Pulse 92   Temp 96.9 °F (36.1 °C) (Oral)   Ht 5' 6\" (1.676 m)   Wt 143 lb 3.2 oz (65 kg)   SpO2 99%   BMI 23.11 kg/m²     General:  alert, appears stated age and cooperative   Abdomen: soft, bowel sounds active, non-tender   Incision:   healing well, no drainage, no erythema       Assessment:      Doing well postoperatively. Plan:      1. Continue any current medications. 2. Wound care discussed. 3. See me 4-6 weeks  4. Discussed topical muscle relaxing cream may help. I called this to [facility] compounding. If he is doing well does not need to .      Tierra Lance M.D.  9/24/21   9:53 AM

## 2021-10-22 ENCOUNTER — TELEPHONE (OUTPATIENT)
Dept: SURGERY | Age: 41
End: 2021-10-22

## 2021-10-22 NOTE — TELEPHONE ENCOUNTER
LVM for patient to call the office to reschedule today's appointment with Dr. Valery Marin due to him performing emergency surgery at [facility].

## 2021-11-05 ENCOUNTER — OFFICE VISIT (OUTPATIENT)
Dept: SURGERY | Age: 41
End: 2021-11-05

## 2021-11-05 VITALS
HEIGHT: 66 IN | DIASTOLIC BLOOD PRESSURE: 87 MMHG | WEIGHT: 150.6 LBS | TEMPERATURE: 98.1 F | BODY MASS INDEX: 24.2 KG/M2 | OXYGEN SATURATION: 97 % | HEART RATE: 106 BPM | SYSTOLIC BLOOD PRESSURE: 131 MMHG

## 2021-11-05 DIAGNOSIS — K64.9 HEMORRHOIDS, UNSPECIFIED HEMORRHOID TYPE: Primary | ICD-10-CM

## 2021-11-05 PROCEDURE — 99024 POSTOP FOLLOW-UP VISIT: CPT | Performed by: SURGERY

## 2021-11-05 NOTE — PROGRESS NOTES
Subjective:       Lyssa Miramontes presents to the clinic about 6 weeks after hemorrhoidectomy    HPI: Since last seen doing well. 1 spot of bleeding two weeks ago and none since. Still some extra skin near anus but no pain. Objective:     Vitals:    11/05/21 1059   BP: 131/87   Pulse: 106   Temp: 98.1 °F (36.7 °C)   SpO2: 97%     General:  alert, appears stated age and cooperative   Abdomen: soft, bowel sounds active, non-tender   Incision:   healing well, no drainage, no erythema . Small amount of redundant skin      Assessment:      Doing well postoperatively. Plan:      1. Continue any current medications. 2. Wound care discussed. Skin should continue to flatten with time  3. See me as needed.  Discussed prevention of future hemorrhoids     Tay Mittal M.D.  11/5/21   11:01 AM

## (undated) DEVICE — SURGICAL SET UP - SURE SET: Brand: MEDLINE INDUSTRIES, INC.

## (undated) DEVICE — GLOVE ORANGE PI 8 1/2   MSG9085

## (undated) DEVICE — E-Z CLEAN, NON-STICK, PTFE COATED, ELECTROSURGICAL BLADE ELECTRODE, 2.5 INCH (6.35 CM): Brand: EZ CLEAN

## (undated) DEVICE — SPONGE,LAP,18"X18",DLX,XR,ST,5/PK,40/PK: Brand: MEDLINE

## (undated) DEVICE — JEWISH HOSPITAL TURNOVER KIT: Brand: MEDLINE INDUSTRIES, INC.

## (undated) DEVICE — PENCIL ES ULT VAC W TELSCP NOSE EZ CLN BLDE 10FT

## (undated) DEVICE — UNDERPANTS INCONT XL 45-70IN KNIT SEAMLESS DSGN COLOR-CODED

## (undated) DEVICE — GARMENT,MEDLINE,DVT,INT,CALF,MED, GEN2: Brand: MEDLINE

## (undated) DEVICE — SUTURE CHROMIC GUT SZ 3-0 L27IN ABSRB BRN L26MM SH 1/2 CIR G122H

## (undated) DEVICE — GLOVE ORANGE PI 8   MSG9080

## (undated) DEVICE — PLATE ES AD W 9FT CRD 2

## (undated) DEVICE — STANDARD HYPODERMIC NEEDLE,POLYPROPYLENE HUB: Brand: MONOJECT

## (undated) DEVICE — TOWEL,STOP FLAG GOLD N-W: Brand: MEDLINE

## (undated) DEVICE — GOWN,SIRUS,POLYRNF,BRTHSLV,XLN/XXL,18/CS: Brand: MEDLINE

## (undated) DEVICE — SURE SET-DOUBLE BASIN-LF: Brand: MEDLINE INDUSTRIES, INC.

## (undated) DEVICE — ST FLUFF LG 1 PLY: Brand: DEROYAL

## (undated) DEVICE — TRAY PREP DRY W/ PREM GLV 2 APPL 6 SPNG 2 UNDPD 1 OVERWRAP

## (undated) DEVICE — PACK LAP IV REINF TBL CVR ADH UTIL UNDERBUTTOCK DRP W CUF

## (undated) DEVICE — PAD,ABDOMINAL,5"X9",ST,LF,25/BX: Brand: MEDLINE INDUSTRIES, INC.

## (undated) DEVICE — COVER LT HNDL BLU PLAS